# Patient Record
Sex: MALE | Race: WHITE | NOT HISPANIC OR LATINO | URBAN - METROPOLITAN AREA
[De-identification: names, ages, dates, MRNs, and addresses within clinical notes are randomized per-mention and may not be internally consistent; named-entity substitution may affect disease eponyms.]

---

## 2022-07-06 ENCOUNTER — APPOINTMENT (OUTPATIENT)
Age: 32
Setting detail: DERMATOLOGY
End: 2022-07-06

## 2022-07-06 VITALS — TEMPERATURE: 98.7 F | HEIGHT: 77 IN | WEIGHT: 185 LBS

## 2022-07-06 DIAGNOSIS — L20.89 OTHER ATOPIC DERMATITIS: ICD-10-CM

## 2022-07-06 PROCEDURE — OTHER MIPS QUALITY: OTHER

## 2022-07-06 PROCEDURE — OTHER PRESCRIPTION: OTHER

## 2022-07-06 PROCEDURE — OTHER DUPIXENT INITIATION: OTHER

## 2022-07-06 PROCEDURE — 99203 OFFICE O/P NEW LOW 30 MIN: CPT

## 2022-07-06 PROCEDURE — OTHER TREATMENT REGIMEN: OTHER

## 2022-07-06 PROCEDURE — OTHER COUNSELING: OTHER

## 2022-07-06 RX ORDER — CLOBETASOL PROPIONATE 0.5 MG/G
CREAM TOPICAL
Qty: 45 | Refills: 2 | Status: ERX | COMMUNITY
Start: 2022-07-06

## 2022-07-06 ASSESSMENT — LOCATION DETAILED DESCRIPTION DERM
LOCATION DETAILED: LEFT DORSAL FOOT
LOCATION DETAILED: LEFT PROXIMAL DORSAL FOREARM
LOCATION DETAILED: RIGHT PROXIMAL PRETIBIAL REGION
LOCATION DETAILED: LEFT PROXIMAL PRETIBIAL REGION
LOCATION DETAILED: RIGHT DORSAL FOOT
LOCATION DETAILED: RIGHT DISTAL DORSAL FOREARM

## 2022-07-06 ASSESSMENT — LOCATION ZONE DERM
LOCATION ZONE: ARM
LOCATION ZONE: FEET
LOCATION ZONE: LEG

## 2022-07-06 ASSESSMENT — LOCATION SIMPLE DESCRIPTION DERM
LOCATION SIMPLE: RIGHT FOOT
LOCATION SIMPLE: LEFT FOREARM
LOCATION SIMPLE: RIGHT FOREARM
LOCATION SIMPLE: RIGHT PRETIBIAL REGION
LOCATION SIMPLE: LEFT PRETIBIAL REGION
LOCATION SIMPLE: LEFT FOOT

## 2022-07-06 NOTE — PROCEDURE: TREATMENT REGIMEN
Show Eucerin Line: Yes
Plan: Dupixent enrollment ppw signed and scanned in today \\nIM sent to Cyn HARMON \\nInformed patient office will call him to RTC for Dupixent injection #1 (out of samples today)
Action 3: Continue
Initiate Regimen: clobetasol 0.05 % topical cream \\nApply to affected area on leg BID x2 weeks, 1week break in between then resume PRN flares
Detail Level: Zone

## 2022-07-06 NOTE — PROCEDURE: COUNSELING
Detail Level: Simple
Cleanser Recommendations: Dove soap
Moisturizer Recommendations: Eucerin Advanced Repair cream
Antihistamine Recommendations: Xyzal daily

## 2022-07-06 NOTE — PROCEDURE: DUPIXENT INITIATION
Add Pregnancy And Lactation Warning To Medication Counseling?: No
Diagnosis (Required): Atopic Dermatitis/Eczematous Dermatitis
Dupixent Dosing: 600 mg SC day 0 then 300 mg SC every other week
Dupixent Monitoring Guidelines: There is no laboratory monitoring requirement with Dupixent.
Detail Level: Generalized
Pregnancy And Lactation Warning Text: There have not been adverse fetal risks in women taking Dupixent while pregnant. It is unknown if this medication is excreted in breast milk.